# Patient Record
Sex: MALE | Race: WHITE
[De-identification: names, ages, dates, MRNs, and addresses within clinical notes are randomized per-mention and may not be internally consistent; named-entity substitution may affect disease eponyms.]

---

## 2023-10-12 ENCOUNTER — HOSPITAL ENCOUNTER (OUTPATIENT)
Dept: HOSPITAL 76 - DI | Age: 28
Discharge: HOME | End: 2023-10-12
Attending: PODIATRIST
Payer: COMMERCIAL

## 2023-10-12 DIAGNOSIS — M79.672: Primary | ICD-10-CM

## 2023-10-13 NOTE — MRI REPORT
PROCEDURE:  FOOT WO - LT

 

INDICATIONS:  LEFT FOOT PAIN

 

TECHNIQUE:  

Noncontrast sagittal T1 spin echo and T2 fast spin echo with fat saturation, long-axis T1 spin echo a
nd STIR, short-axis T1 spin echo and T2 fast spin echo with fat saturation through the forefoot.   

 

COMPARISON:  None.

 

FINDINGS:  

Image quality:  Excellent.  

 

Bones and joints:  No bone marrow contusions or metatarsal stress fractures.  The sesamoid bones appe
ar in expected positions, without internal edema.  No metatarsophalangeal joint degeneration.  No int
raosseous lesions.  

 

Soft tissues:  The visualized plantar foot muscles demonstrate normal signal and bulk.  Visualized fl
exor and extensor tendons appear intact, without tenosynovitis. Small nonspecific intermetatarsal bur
sal effusions. No significant ganglion cyst. Sagittal images demonstrate no evidence for plantar plat
e tears.  

 

IMPRESSION:  No acute osseous abnormality. No significant ligament or tendon injury is seen.

 

Reviewed by: Chris Lopez MD on 10/13/2023 11:51 AM PDT

Approved by: Chris Lopez MD on 10/13/2023 11:51 AM PDT

 

 

Station ID:  [id number]